# Patient Record
Sex: MALE | Race: WHITE | NOT HISPANIC OR LATINO | URBAN - METROPOLITAN AREA
[De-identification: names, ages, dates, MRNs, and addresses within clinical notes are randomized per-mention and may not be internally consistent; named-entity substitution may affect disease eponyms.]

---

## 2020-07-23 ENCOUNTER — OFFICE VISIT (OUTPATIENT)
Dept: URBAN - METROPOLITAN AREA CLINIC 27 | Facility: CLINIC | Age: 60
End: 2020-07-23

## 2020-07-23 PROBLEM — 266435005 GASTRO-ESOPHAGEAL REFLUX DISEASE WITHOUT ESOPHAGITIS: Status: ACTIVE | Noted: 2020-07-23

## 2020-07-23 PROBLEM — 59621000 ESSENTIAL HYPERTENSION: Status: ACTIVE | Noted: 2020-07-23

## 2020-07-23 PROBLEM — 238131007 OVERWEIGHT: Status: ACTIVE | Noted: 2020-07-23

## 2020-07-23 PROBLEM — 86406008 HUMAN IMMUNODEFICIENCY VIRUS INFECTION: Status: ACTIVE | Noted: 2020-07-23

## 2020-07-23 PROBLEM — 449681000124101 LONG-TERM CURRENT USE OF ANTIPLATELET DRUG: Status: INACTIVE | Noted: 2020-07-23

## 2020-07-23 PROBLEM — 275978004 SCREENING FOR MALIGNANT NEOPLASM OF COLON: Status: ACTIVE | Noted: 2020-07-23

## 2020-08-17 ENCOUNTER — OFFICE VISIT (OUTPATIENT)
Dept: URBAN - METROPOLITAN AREA SURGERY CENTER 7 | Facility: SURGERY CENTER | Age: 60
End: 2020-08-17

## 2021-12-22 ENCOUNTER — OFFICE VISIT (OUTPATIENT)
Dept: URBAN - METROPOLITAN AREA SURGERY CENTER 7 | Facility: SURGERY CENTER | Age: 61
End: 2021-12-22

## 2021-12-24 ENCOUNTER — LAB OUTSIDE AN ENCOUNTER (OUTPATIENT)
Dept: URBAN - METROPOLITAN AREA CLINIC 121 | Facility: CLINIC | Age: 61
End: 2021-12-24

## 2021-12-24 LAB
B-12: 418
BASOPH COUNT: (no result)
BASOPHIL %: 0.4
EOS COUNT: (no result)
EOSINOPHIL %: 0.4
FERRITIN: 67
FOLATE: 16.1
HCT: 36.6
HGB: 12.5
IRON SATUR %: (no result)
IRON: 104
LYMPHS %: 29.1
MCH: 33.5
MCHC: (no result)
MCV: 98.1
MONOCYTE %: 6.6
MONOSCT AUTO: (no result)
PLATELETS: (no result)
PMN %: 63.5
RBC: (no result)
RDW: 11.8
RETIC COUNT: 1.2
TIBC: (no result)
WBC: (no result)
ZZ-GE-UNK: (no result)
ZZ-GE-UNK: (no result)

## 2022-01-21 ENCOUNTER — OFFICE VISIT (OUTPATIENT)
Dept: URBAN - METROPOLITAN AREA CLINIC 27 | Facility: CLINIC | Age: 62
End: 2022-01-21

## 2022-01-21 PROBLEM — 62315008 DIARRHEA: Status: ACTIVE | Noted: 2022-01-21

## 2022-01-21 PROBLEM — 87522002 IRON DEFICIENCY ANEMIA: Status: ACTIVE | Noted: 2022-01-21

## 2022-04-30 ENCOUNTER — TELEPHONE ENCOUNTER (OUTPATIENT)
Dept: URBAN - METROPOLITAN AREA CLINIC 121 | Facility: CLINIC | Age: 62
End: 2022-04-30

## 2022-04-30 RX ORDER — POLYETHYLENE GLYCOL-3350 AND ELECTROLYTES 236; 6.74; 5.86; 2.97; 22.74 G/274.31G; G/274.31G; G/274.31G; G/274.31G; G/274.31G
TAKE AS DIRECTED MIX AND USE AS DIRECTED; MAY SUBSTITUTE WITH TRILYTE, GOLYTE, GAVILYTE-G, -N, -C, OR GENERIC IF NEEDED POWDER, FOR SOLUTION ORAL
OUTPATIENT
Start: 2021-11-15

## 2022-04-30 RX ORDER — POLYETHYLENE GLYCOL-3350 AND ELECTROLYTES 236; 6.74; 5.86; 2.97; 22.74 G/274.31G; G/274.31G; G/274.31G; G/274.31G; G/274.31G
TAKE AS DIRECTED MIX AND USE AS DIRECTED; MAY SUBSTITUTE WITH TRILYTE, GOLYTE, GAVILYTE-G, -N, -C, OR GENERIC IF NEEDED POWDER, FOR SOLUTION ORAL
OUTPATIENT
Start: 2021-11-15 | End: 2021-12-23

## 2022-05-01 ENCOUNTER — TELEPHONE ENCOUNTER (OUTPATIENT)
Dept: URBAN - METROPOLITAN AREA CLINIC 121 | Facility: CLINIC | Age: 62
End: 2022-05-01

## 2022-05-01 RX ORDER — IRBESARTAN 300 MG/1
TABLET ORAL
Status: ACTIVE | COMMUNITY
Start: 2020-07-23

## 2022-05-01 RX ORDER — BICTEGRAVIR SODIUM, EMTRICITABINE, AND TENOFOVIR ALAFENAMIDE FUMARATE 50; 200; 25 MG/1; MG/1; MG/1
TABLET ORAL
Status: ACTIVE | COMMUNITY
Start: 2020-07-23

## 2022-05-01 RX ORDER — ESCITALOPRAM 10 MG/1
TABLET, FILM COATED ORAL
Status: ACTIVE | COMMUNITY
Start: 2020-07-23

## 2022-05-01 RX ORDER — OMEGA-3S/DHA/EPA/FISH OIL 980-1400MG
CAPSULE,DELAYED RELEASE (ENTERIC COATED) ORAL
Status: ACTIVE | COMMUNITY
Start: 2020-07-23

## 2022-05-01 RX ORDER — ROSUVASTATIN CALCIUM 40 MG/1
TABLET, FILM COATED ORAL
Status: ACTIVE | COMMUNITY
Start: 2020-07-23

## 2022-05-01 RX ORDER — TRIAMCINOLONE ACETONIDE 55 UG/1
SPRAY, METERED NASAL
Status: ACTIVE | COMMUNITY
Start: 2020-07-23

## 2022-05-01 RX ORDER — AMLODIPINE AND BENAZEPRIL HYDROCHLORIDE 5; 20 MG/1; MG/1
CAPSULE ORAL
Status: ACTIVE | COMMUNITY

## 2022-10-20 ENCOUNTER — WEB ENCOUNTER (OUTPATIENT)
Dept: URBAN - METROPOLITAN AREA CLINIC 27 | Facility: CLINIC | Age: 62
End: 2022-10-20

## 2022-10-20 ENCOUNTER — CLAIMS CREATED FROM THE CLAIM WINDOW (OUTPATIENT)
Dept: URBAN - METROPOLITAN AREA CLINIC 27 | Facility: CLINIC | Age: 62
End: 2022-10-20
Payer: COMMERCIAL

## 2022-10-20 VITALS
BODY MASS INDEX: 24.86 KG/M2 | SYSTOLIC BLOOD PRESSURE: 138 MMHG | TEMPERATURE: 96.9 F | HEIGHT: 68 IN | WEIGHT: 164 LBS | HEART RATE: 73 BPM | DIASTOLIC BLOOD PRESSURE: 81 MMHG

## 2022-10-20 DIAGNOSIS — B20 HIV INFECTION: ICD-10-CM

## 2022-10-20 DIAGNOSIS — I10 HYPERTENSION: ICD-10-CM

## 2022-10-20 DIAGNOSIS — D50.9 IRON DEFICIENCY ANEMIA: ICD-10-CM

## 2022-10-20 DIAGNOSIS — R19.5 GUAIAC POSITIVE STOOLS: ICD-10-CM

## 2022-10-20 PROBLEM — 38341003 HYPERTENSION: Status: ACTIVE | Noted: 2022-10-20

## 2022-10-20 PROBLEM — 86406008 HIV INFECTION: Status: ACTIVE | Noted: 2022-10-20

## 2022-10-20 PROCEDURE — 99243 OFF/OP CNSLTJ NEW/EST LOW 30: CPT | Performed by: INTERNAL MEDICINE

## 2022-10-20 PROCEDURE — 99214 OFFICE O/P EST MOD 30 MIN: CPT | Performed by: INTERNAL MEDICINE

## 2022-10-20 RX ORDER — AMLODIPINE AND BENAZEPRIL HYDROCHLORIDE 5; 20 MG/1; MG/1
CAPSULE ORAL
Status: ACTIVE | COMMUNITY

## 2022-10-20 RX ORDER — ESCITALOPRAM 10 MG/1
TABLET, FILM COATED ORAL
Status: ACTIVE | COMMUNITY
Start: 2020-07-23

## 2022-10-20 RX ORDER — TRIAMCINOLONE ACETONIDE 55 UG/1
SPRAY, METERED NASAL
Status: ACTIVE | COMMUNITY
Start: 2020-07-23

## 2022-10-20 RX ORDER — OMEGA-3S/DHA/EPA/FISH OIL 980-1400MG
CAPSULE,DELAYED RELEASE (ENTERIC COATED) ORAL
Status: ACTIVE | COMMUNITY
Start: 2020-07-23

## 2022-10-20 RX ORDER — BICTEGRAVIR SODIUM, EMTRICITABINE, AND TENOFOVIR ALAFENAMIDE FUMARATE 50; 200; 25 MG/1; MG/1; MG/1
TABLET ORAL
Status: ACTIVE | COMMUNITY
Start: 2020-07-23

## 2022-10-20 RX ORDER — IRBESARTAN 300 MG/1
TABLET ORAL
Status: ACTIVE | COMMUNITY
Start: 2020-07-23

## 2022-10-20 RX ORDER — ROSUVASTATIN CALCIUM 40 MG/1
TABLET, FILM COATED ORAL
Status: ACTIVE | COMMUNITY
Start: 2020-07-23

## 2022-10-20 NOTE — HPI-TODAY'S VISIT:
Patient here at the request of Dr. Collins for follow up of iron deficiency anemia >1y. He currently has no GI symptoms. His stools are regular with a probiotic and a fiber supplement. He has not had any melena or rectal bleeding, though he states that a stool guaiac in February showed trace heme-positive stool. He has been evaluated by hematology, and no explanation for his iron deficiency anemia has been found. He stopped taking an iron supplement last month. His hemoglobin has slowly declined since May 2021 (14.2) to 12.2 last month. His iron levels have been consistently normal throughout this time period. There is no family history of colon cancer, polyps or anemia. He has not had a prior upper endoscopy or capsule endoscopy. He does not use any NSAIDs. His last colonoscopy was in late 2021. This was a normal exam, and without polyps or colitis, though the prep was somewhat suboptimal. Stool H. pylori antigen was negative during that exam. He is HIV+ and his viral load is undetectable with antiviral therapy.

## 2022-10-21 ENCOUNTER — LAB OUTSIDE AN ENCOUNTER (OUTPATIENT)
Dept: URBAN - METROPOLITAN AREA CLINIC 27 | Facility: CLINIC | Age: 62
End: 2022-10-21

## 2022-11-11 ENCOUNTER — CLAIMS CREATED FROM THE CLAIM WINDOW (OUTPATIENT)
Dept: URBAN - METROPOLITAN AREA SURGERY CENTER 7 | Facility: SURGERY CENTER | Age: 62
End: 2022-11-11
Payer: COMMERCIAL

## 2022-11-11 ENCOUNTER — OFFICE VISIT (OUTPATIENT)
Dept: URBAN - METROPOLITAN AREA SURGERY CENTER 7 | Facility: SURGERY CENTER | Age: 62
End: 2022-11-11

## 2022-11-11 ENCOUNTER — LAB OUTSIDE AN ENCOUNTER (OUTPATIENT)
Dept: URBAN - METROPOLITAN AREA CLINIC 27 | Facility: CLINIC | Age: 62
End: 2022-11-11

## 2022-11-11 ENCOUNTER — CLAIMS CREATED FROM THE CLAIM WINDOW (OUTPATIENT)
Dept: URBAN - METROPOLITAN AREA CLINIC 4 | Facility: CLINIC | Age: 62
End: 2022-11-11
Payer: COMMERCIAL

## 2022-11-11 DIAGNOSIS — K31.89 ACQUIRED DEFORMITY OF DUODENUM: ICD-10-CM

## 2022-11-11 DIAGNOSIS — R19.5 ABNORMAL CONSISTENCY OF STOOL: ICD-10-CM

## 2022-11-11 DIAGNOSIS — K31.89 OTHER DISEASES OF STOMACH AND DUODENUM: ICD-10-CM

## 2022-11-11 DIAGNOSIS — D50.9 ANEMIA: ICD-10-CM

## 2022-11-11 PROCEDURE — 43239 EGD BIOPSY SINGLE/MULTIPLE: CPT | Performed by: INTERNAL MEDICINE

## 2022-11-11 PROCEDURE — G8907 PT DOC NO EVENTS ON DISCHARG: HCPCS | Performed by: INTERNAL MEDICINE

## 2022-11-11 PROCEDURE — 88305 TISSUE EXAM BY PATHOLOGIST: CPT | Performed by: PATHOLOGY

## 2022-11-11 RX ORDER — TRIAMCINOLONE ACETONIDE 55 UG/1
SPRAY, METERED NASAL
Status: ACTIVE | COMMUNITY
Start: 2020-07-23

## 2022-11-11 RX ORDER — BICTEGRAVIR SODIUM, EMTRICITABINE, AND TENOFOVIR ALAFENAMIDE FUMARATE 50; 200; 25 MG/1; MG/1; MG/1
TABLET ORAL
Status: ACTIVE | COMMUNITY
Start: 2020-07-23

## 2022-11-11 RX ORDER — AMLODIPINE AND BENAZEPRIL HYDROCHLORIDE 5; 20 MG/1; MG/1
CAPSULE ORAL
Status: ACTIVE | COMMUNITY

## 2022-11-11 RX ORDER — ROSUVASTATIN CALCIUM 40 MG/1
TABLET, FILM COATED ORAL
Status: ACTIVE | COMMUNITY
Start: 2020-07-23

## 2022-11-11 RX ORDER — IRBESARTAN 300 MG/1
TABLET ORAL
Status: ACTIVE | COMMUNITY
Start: 2020-07-23

## 2022-11-11 RX ORDER — ESCITALOPRAM 10 MG/1
TABLET, FILM COATED ORAL
Status: ACTIVE | COMMUNITY
Start: 2020-07-23

## 2022-11-11 RX ORDER — OMEGA-3S/DHA/EPA/FISH OIL 980-1400MG
CAPSULE,DELAYED RELEASE (ENTERIC COATED) ORAL
Status: ACTIVE | COMMUNITY
Start: 2020-07-23

## 2022-11-12 LAB
ABSOLUTE BASOPHILS: 12
ABSOLUTE EOSINOPHILS: 29
ABSOLUTE LYMPHOCYTES: 1380
ABSOLUTE MONOCYTES: 412
ABSOLUTE NEUTROPHILS: 3967
BASOPHILS: 0.2
EOSINOPHILS: 0.5
FERRITIN, SERUM: 57
HEMATOCRIT: 36
HEMOGLOBIN: 12.4
IRON BIND.CAP.(TIBC): 312
IRON SATURATION: 39
IRON: 123
LYMPHOCYTES: 23.8
MCH: 33.6
MCHC: 34.4
MCV: 97.6
MONOCYTES: 7.1
MPV: 9.8
NEUTROPHILS: 68.4
PLATELET COUNT: 208
RDW: 12.3
RED BLOOD CELL COUNT: 3.69
WHITE BLOOD CELL COUNT: 5.8

## 2022-11-14 ENCOUNTER — TELEPHONE ENCOUNTER (OUTPATIENT)
Dept: URBAN - METROPOLITAN AREA CLINIC 27 | Facility: CLINIC | Age: 62
End: 2022-11-14

## 2023-05-18 ENCOUNTER — P2P PATIENT RECORD (OUTPATIENT)
Age: 63
End: 2023-05-18

## 2023-06-22 ENCOUNTER — LAB OUTSIDE AN ENCOUNTER (OUTPATIENT)
Dept: URBAN - METROPOLITAN AREA CLINIC 27 | Facility: CLINIC | Age: 63
End: 2023-06-22

## 2023-06-22 ENCOUNTER — OFFICE VISIT (OUTPATIENT)
Dept: URBAN - METROPOLITAN AREA CLINIC 27 | Facility: CLINIC | Age: 63
End: 2023-06-22
Payer: COMMERCIAL

## 2023-06-22 ENCOUNTER — DASHBOARD ENCOUNTERS (OUTPATIENT)
Age: 63
End: 2023-06-22

## 2023-06-22 VITALS
DIASTOLIC BLOOD PRESSURE: 74 MMHG | HEIGHT: 68 IN | WEIGHT: 166 LBS | BODY MASS INDEX: 25.16 KG/M2 | SYSTOLIC BLOOD PRESSURE: 132 MMHG

## 2023-06-22 DIAGNOSIS — R74.01 HIGH TRANSAMINASE LEVELS: ICD-10-CM

## 2023-06-22 DIAGNOSIS — D50.9 IRON DEFICIENCY ANEMIA: ICD-10-CM

## 2023-06-22 DIAGNOSIS — B20 HIV INFECTION: ICD-10-CM

## 2023-06-22 DIAGNOSIS — E66.3 OVERWEIGHT: ICD-10-CM

## 2023-06-22 PROCEDURE — 99203 OFFICE O/P NEW LOW 30 MIN: CPT | Performed by: INTERNAL MEDICINE

## 2023-06-22 PROCEDURE — 99243 OFF/OP CNSLTJ NEW/EST LOW 30: CPT | Performed by: INTERNAL MEDICINE

## 2023-06-22 RX ORDER — ESCITALOPRAM 10 MG/1
TABLET, FILM COATED ORAL
Status: ACTIVE | COMMUNITY
Start: 2020-07-23

## 2023-06-22 RX ORDER — ROSUVASTATIN CALCIUM 40 MG/1
TABLET, FILM COATED ORAL
Status: ACTIVE | COMMUNITY
Start: 2020-07-23

## 2023-06-22 RX ORDER — IRBESARTAN 300 MG/1
TABLET ORAL
Status: ACTIVE | COMMUNITY
Start: 2020-07-23

## 2023-06-22 RX ORDER — AMLODIPINE AND BENAZEPRIL HYDROCHLORIDE 5; 20 MG/1; MG/1
CAPSULE ORAL
Status: ACTIVE | COMMUNITY

## 2023-06-22 RX ORDER — OMEGA-3S/DHA/EPA/FISH OIL 980-1400MG
CAPSULE,DELAYED RELEASE (ENTERIC COATED) ORAL
Status: ACTIVE | COMMUNITY
Start: 2020-07-23

## 2023-06-22 RX ORDER — TRIAMCINOLONE ACETONIDE 55 UG/1
SPRAY, METERED NASAL
Status: ACTIVE | COMMUNITY
Start: 2020-07-23

## 2023-06-22 RX ORDER — BICTEGRAVIR SODIUM, EMTRICITABINE, AND TENOFOVIR ALAFENAMIDE FUMARATE 50; 200; 25 MG/1; MG/1; MG/1
TABLET ORAL
Status: ACTIVE | COMMUNITY
Start: 2020-07-23

## 2023-06-22 NOTE — HPI-TODAY'S VISIT:
Patient at the request of Dr. Pizano for evaluation of iron deficiency anemia that has been intermittently present over the past year and a half.  Hematology evaluation, including bone marrow biopsy, has apparently been unrevealing.  He underwent EGD 11/2022; this revealed very mild gastritis and duodenitis.  Gastric and small bowel biopsies were entirely normal.  He was heme-negative during that exam.  His last colonoscopy was in late 2021; this was normal and without polyps or colitis, though the prep was somewhat suboptimal.  Stool H. pylori antigen was negative.  Recent labs show a hemoglobin of 13.3.  His MCV was 102.  His ferritin was low normal at 50.  His iron level was 50 and his iron sat was 14.8.  His liver enzymes were normal aside from an AST of 42.  He has no GI symptoms currently.  He is HIV positive and his viral load is undetectable with Biktarvy.  He was started on ferrous sulfate 325 mg twice daily 1 month ago.  He drinks a bottle of wine per week.  There is no family history of colon cancer, polyps or anemia.  He has been on an iron supplement intermittently over the past 1.5y; when he does take an iron supplement, his iron deficiency anemia apparently resolves.  He does not use NSAIDs.  He has not had a prior capsule endoscopy.

## 2023-06-29 ENCOUNTER — OFFICE VISIT (OUTPATIENT)
Dept: URBAN - METROPOLITAN AREA CLINIC 26 | Facility: CLINIC | Age: 63
End: 2023-06-29
Payer: COMMERCIAL

## 2023-06-29 DIAGNOSIS — D50.9 IRON DEFICIENCY ANEMIA: ICD-10-CM

## 2023-06-29 PROCEDURE — 91110 GI TRC IMG INTRAL ESOPH-ILE: CPT | Performed by: INTERNAL MEDICINE
